# Patient Record
Sex: MALE | Race: WHITE | NOT HISPANIC OR LATINO
[De-identification: names, ages, dates, MRNs, and addresses within clinical notes are randomized per-mention and may not be internally consistent; named-entity substitution may affect disease eponyms.]

---

## 2017-01-23 PROBLEM — I70.0 AORTIC ATHEROSCLEROSIS (HCC): Status: ACTIVE | Noted: 2017-01-23

## 2017-01-23 PROBLEM — J84.10 CALCIFIED GRANULOMA OF LUNG (HCC): Status: ACTIVE | Noted: 2017-01-23

## 2017-04-13 PROBLEM — M75.02 ADHESIVE CAPSULITIS OF LEFT SHOULDER: Status: ACTIVE | Noted: 2017-04-13

## 2017-05-30 PROBLEM — Z80.8 FAMILY HISTORY OF SKIN CANCER: Status: ACTIVE | Noted: 2017-05-30

## 2018-01-26 PROBLEM — M75.02 ADHESIVE CAPSULITIS OF LEFT SHOULDER: Status: RESOLVED | Noted: 2017-04-13 | Resolved: 2018-01-26

## 2018-01-26 PROBLEM — Z80.8 FAMILY HISTORY OF SKIN CANCER: Status: RESOLVED | Noted: 2017-05-30 | Resolved: 2018-01-26

## 2018-02-03 PROCEDURE — 86141 C-REACTIVE PROTEIN HS: CPT | Performed by: FAMILY MEDICINE

## 2018-06-15 PROCEDURE — 87045 FECES CULTURE AEROBIC BACT: CPT | Performed by: FAMILY MEDICINE

## 2018-06-15 PROCEDURE — 87046 STOOL CULTR AEROBIC BACT EA: CPT | Performed by: FAMILY MEDICINE

## 2018-06-15 PROCEDURE — 87427 SHIGA-LIKE TOXIN AG IA: CPT | Performed by: FAMILY MEDICINE

## 2018-06-19 PROCEDURE — 87507 IADNA-DNA/RNA PROBE TQ 12-25: CPT | Performed by: INTERNAL MEDICINE

## 2018-06-20 PROBLEM — M19.079 ANKLE ARTHRITIS: Status: ACTIVE | Noted: 2018-06-20

## 2018-09-28 PROBLEM — S83.241D ACUTE MEDIAL MENISCUS TEAR OF RIGHT KNEE, SUBSEQUENT ENCOUNTER: Status: ACTIVE | Noted: 2018-09-28

## 2018-10-23 PROBLEM — Z98.890 S/P ARTHROSCOPIC PARTIAL MEDIAL MENISCECTOMY: Status: ACTIVE | Noted: 2018-10-23

## 2018-10-23 PROBLEM — M25.561 ACUTE PAIN OF RIGHT KNEE: Status: ACTIVE | Noted: 2018-10-23

## 2019-01-25 ENCOUNTER — HOSPITAL (OUTPATIENT)
Dept: OTHER | Age: 79
End: 2019-01-25

## 2019-02-04 PROBLEM — Z98.890 S/P ARTHROSCOPIC PARTIAL MEDIAL MENISCECTOMY: Status: RESOLVED | Noted: 2018-10-23 | Resolved: 2019-02-04

## 2019-02-04 PROBLEM — Z80.8 FAMILY HISTORY OF SKIN CANCER: Status: RESOLVED | Noted: 2017-05-30 | Resolved: 2019-02-04

## 2019-02-04 PROBLEM — M25.561 ACUTE PAIN OF RIGHT KNEE: Status: RESOLVED | Noted: 2018-10-23 | Resolved: 2019-02-04

## 2019-02-04 PROBLEM — I35.9 AORTIC VALVE CALCIFICATION: Status: ACTIVE | Noted: 2019-02-04

## 2019-02-04 PROBLEM — M19.079 ANKLE ARTHRITIS: Status: RESOLVED | Noted: 2018-06-20 | Resolved: 2019-02-04

## 2019-03-13 ENCOUNTER — HOSPITAL (OUTPATIENT)
Dept: OTHER | Age: 79
End: 2019-03-13

## 2019-11-05 ENCOUNTER — RT VISIT (OUTPATIENT)
Dept: RESPIRATORY THERAPY | Facility: HOSPITAL | Age: 79
End: 2019-11-05
Attending: INTERNAL MEDICINE
Payer: MEDICARE

## 2019-11-05 DIAGNOSIS — R06.00 DOE (DYSPNEA ON EXERTION): ICD-10-CM

## 2019-11-05 DIAGNOSIS — R05.9 COUGH: ICD-10-CM

## 2019-11-05 PROCEDURE — 94070 EVALUATION OF WHEEZING: CPT

## 2019-11-06 NOTE — PROCEDURES
Renetta Prieto is a 71-year-old  male who stands 5 feet 10 inches tall and weighs 161 pounds. He underwent methacholine challenge testing on 11/5/2019. He carries a diagnosis of both cough and dyspnea on exertion. No smoking history is recorded.   Re

## 2019-12-12 PROBLEM — I25.118 CORONARY ARTERY DISEASE OF NATIVE ARTERY OF NATIVE HEART WITH STABLE ANGINA PECTORIS (HCC): Status: ACTIVE | Noted: 2019-12-12

## 2020-02-03 PROBLEM — M19.049 HAND ARTHRITIS: Status: ACTIVE | Noted: 2020-02-03

## 2020-02-03 PROBLEM — R73.01 IMPAIRED FASTING GLUCOSE: Status: ACTIVE | Noted: 2020-02-03

## 2021-01-07 PROBLEM — I42.2 HYPERTROPHIC CARDIOMYOPATHY (HCC): Status: ACTIVE | Noted: 2021-01-07

## 2022-01-03 PROBLEM — E46 PROTEIN-CALORIE MALNUTRITION, UNSPECIFIED SEVERITY (HCC): Status: RESOLVED | Noted: 2022-01-03 | Resolved: 2022-01-03

## 2022-01-03 PROBLEM — J84.10 CALCIFIED GRANULOMA OF LUNG (HCC): Status: RESOLVED | Noted: 2017-01-23 | Resolved: 2022-01-03

## 2022-01-03 PROBLEM — I27.20 MILD PULMONARY HYPERTENSION (HCC): Status: ACTIVE | Noted: 2022-01-03

## 2022-01-03 PROBLEM — E46 PROTEIN-CALORIE MALNUTRITION, UNSPECIFIED SEVERITY (HCC): Status: ACTIVE | Noted: 2022-01-03

## 2025-01-02 NOTE — PROGRESS NOTES
Left message to call back
Left message to call back
Patient notified of results, verbalizes understanding. All questions answered. Scheduled 11/14 with Dr. Cailin Durham.
Please inform pt that his methacholine challenge test was negative, so this essentially rules out asthma. He can f/u with me to discuss further    Mitchel Ospina M.D.   Pulmonary/Critical Care
4 = No assist / stand by assistance